# Patient Record
Sex: MALE | Race: WHITE | NOT HISPANIC OR LATINO | URBAN - METROPOLITAN AREA
[De-identification: names, ages, dates, MRNs, and addresses within clinical notes are randomized per-mention and may not be internally consistent; named-entity substitution may affect disease eponyms.]

---

## 2017-02-01 ENCOUNTER — OUTPATIENT (OUTPATIENT)
Dept: OUTPATIENT SERVICES | Facility: HOSPITAL | Age: 67
LOS: 1 days | Discharge: HOME | End: 2017-02-01

## 2018-01-02 ENCOUNTER — OUTPATIENT (OUTPATIENT)
Dept: OUTPATIENT SERVICES | Facility: HOSPITAL | Age: 68
LOS: 1 days | Discharge: HOME | End: 2018-01-02

## 2019-01-09 ENCOUNTER — OUTPATIENT (OUTPATIENT)
Dept: OUTPATIENT SERVICES | Facility: HOSPITAL | Age: 69
LOS: 1 days | End: 2019-01-09

## 2019-12-16 ENCOUNTER — OUTPATIENT (OUTPATIENT)
Dept: OUTPATIENT SERVICES | Facility: HOSPITAL | Age: 69
LOS: 1 days | End: 2019-12-16

## 2020-01-28 ENCOUNTER — APPOINTMENT (OUTPATIENT)
Dept: RADIOLOGY | Facility: CLINIC | Age: 70
End: 2020-01-28
Payer: MEDICARE

## 2020-01-28 PROCEDURE — 73620 X-RAY EXAM OF FOOT: CPT | Mod: 50

## 2020-01-28 PROCEDURE — 73610 X-RAY EXAM OF ANKLE: CPT | Mod: 50

## 2020-01-28 PROCEDURE — 77080 DXA BONE DENSITY AXIAL: CPT

## 2020-03-12 ENCOUNTER — OUTPATIENT (OUTPATIENT)
Dept: OUTPATIENT SERVICES | Facility: HOSPITAL | Age: 70
LOS: 1 days | End: 2020-03-12

## 2020-03-16 ENCOUNTER — APPOINTMENT (OUTPATIENT)
Dept: ULTRASOUND IMAGING | Facility: CLINIC | Age: 70
End: 2020-03-16
Payer: MEDICARE

## 2020-03-16 PROCEDURE — 76700 US EXAM ABDOM COMPLETE: CPT

## 2020-03-16 PROCEDURE — 76857 US EXAM PELVIC LIMITED: CPT

## 2021-02-23 ENCOUNTER — APPOINTMENT (OUTPATIENT)
Dept: ULTRASOUND IMAGING | Facility: CLINIC | Age: 71
End: 2021-02-23
Payer: MEDICARE

## 2021-02-23 PROCEDURE — 76770 US EXAM ABDO BACK WALL COMP: CPT

## 2021-02-24 ENCOUNTER — TRANSCRIPTION ENCOUNTER (OUTPATIENT)
Age: 71
End: 2021-02-24

## 2021-03-03 ENCOUNTER — APPOINTMENT (OUTPATIENT)
Dept: MRI IMAGING | Facility: CLINIC | Age: 71
End: 2021-03-03
Payer: MEDICARE

## 2021-03-03 PROCEDURE — 72195 MRI PELVIS W/O DYE: CPT | Mod: MH

## 2021-03-12 PROBLEM — Z00.00 ENCOUNTER FOR PREVENTIVE HEALTH EXAMINATION: Status: ACTIVE | Noted: 2021-03-12

## 2022-05-06 ENCOUNTER — APPOINTMENT (OUTPATIENT)
Dept: NEUROSURGERY | Facility: CLINIC | Age: 72
End: 2022-05-06
Payer: MEDICARE

## 2022-05-06 DIAGNOSIS — M54.9 DORSALGIA, UNSPECIFIED: ICD-10-CM

## 2022-05-06 DIAGNOSIS — M48.061 SPINAL STENOSIS, LUMBAR REGION WITHOUT NEUROGENIC CLAUDICATION: ICD-10-CM

## 2022-05-06 PROCEDURE — 99204 OFFICE O/P NEW MOD 45 MIN: CPT

## 2022-05-06 RX ORDER — CELECOXIB 200 MG/1
200 CAPSULE ORAL DAILY
Qty: 30 | Refills: 0 | Status: ACTIVE | COMMUNITY
Start: 2022-05-06 | End: 1900-01-01

## 2022-05-06 NOTE — HISTORY OF PRESENT ILLNESS
[< 3 months] : less than 3 months [FreeTextEntry1] : Low back pain/RLE radiculopathy [de-identified] : 71-year-old male presents to the neurosurgery office with his wife for an initial office visit for evaluation of back pain and right lower extremity radicular symptoms.  Patient states that his symptoms have been present for about a month with no mechanism of injury or recent trauma.  The patient was referred to our office by Dr. Meza.  He reports an incident where he had complaints of right buttock and right lower extremity pain, however not traveling past the knee.  With rest, modified activity, muscle relaxants, and physical therapy, patient states that his symptoms improved.  The patient had another recent incident about a week ago with similar complaints.  He reports that he is feeling better after again undergoing conservative treatment his primary care physician ordered an MRI of the lumbar spine which was performed at [place] which is available for review today.\par \par The patient also mentions previous history of a craniotomy which was performed approximately 9 years ago.

## 2022-05-06 NOTE — ASSESSMENT
[FreeTextEntry1] : Discussed the history, physical examination findings, and recent imaging with the patient and his wife with all questions answered.  Discussed that conservative treatment recommended at this time with rest and modified activity, anti-inflammatory medication/muscle relaxants, physical therapy, and pain management with referrals provided today.   Celebrex 200 mg has been sent electronically to the patient's pharmacy.  Discussed that there is no role for neurosurgical intervention at this time patient will follow up only as needed.

## 2022-05-06 NOTE — REASON FOR VISIT
[New Patient Visit] : a new patient visit [FreeTextEntry1] : PT here with lower back, buttocks,and R leg pain. Has disc.

## 2022-05-06 NOTE — PHYSICAL EXAM
[General Appearance - Alert] : alert [General Appearance - In No Acute Distress] : in no acute distress [General Appearance - Well Nourished] : well nourished [General Appearance - Well Developed] : well developed [General Appearance - Well-Appearing] : healthy appearing [] : normal voice and communication [Oriented To Time, Place, And Person] : oriented to person, place, and time [Impaired Insight] : insight and judgment were intact [Affect] : the affect was normal [Mood] : the mood was normal [Memory Recent] : recent memory was not impaired [Memory Remote] : remote memory was not impaired [Person] : oriented to person [Place] : oriented to place [Time] : oriented to time [Motor Tone] : muscle tone was normal in all four extremities [Motor Strength] : muscle strength was normal in all four extremities [Involuntary Movements] : no involuntary movements were seen [No Muscle Atrophy] : normal bulk in all four extremities [Abnormal Walk] : normal gait [FreeTextEntry8] : Patient ambulates unassisted, but reports that he does have a cane handy when needed

## 2022-05-06 NOTE — DATA REVIEWED
[de-identified] : Has been reviewed with the patient ([facility] -disc returned to the patient) -demonstrates lumbar stenosis most pronounced at L3-4 and L4-5

## 2022-11-10 ENCOUNTER — OFFICE (OUTPATIENT)
Dept: URBAN - METROPOLITAN AREA CLINIC 38 | Facility: CLINIC | Age: 72
Setting detail: OPHTHALMOLOGY
End: 2022-11-10
Payer: MEDICARE

## 2022-11-10 DIAGNOSIS — Z79.899: ICD-10-CM

## 2022-11-10 DIAGNOSIS — H35.033: ICD-10-CM

## 2022-11-10 DIAGNOSIS — H25.13: ICD-10-CM

## 2022-11-10 DIAGNOSIS — H43.393: ICD-10-CM

## 2022-11-10 DIAGNOSIS — H35.40: ICD-10-CM

## 2022-11-10 PROCEDURE — 92083 EXTENDED VISUAL FIELD XM: CPT | Performed by: OPHTHALMOLOGY

## 2022-11-10 PROCEDURE — 99213 OFFICE O/P EST LOW 20 MIN: CPT | Performed by: OPHTHALMOLOGY

## 2022-11-10 ASSESSMENT — REFRACTION_CURRENTRX
OS_ADD: +2.25
OS_OVR_VA: 20/
OS_AXIS: 074
OS_CYLINDER: -2.00
OS_VPRISM_DIRECTION: PROGS
OD_CYLINDER: -1.50
OD_SPHERE: +1.25
OD_ADD: +2.25
OS_SPHERE: PLANO
OD_VPRISM_DIRECTION: PROGS
OD_OVR_VA: 20/
OD_AXIS: 141
OS_ADD: +2.25
OS_AXIS: 070
OD_SPHERE: +1.00
OD_VPRISM_DIRECTION: PROGS
OS_CYLINDER: -2.00
OS_VPRISM_DIRECTION: PROGS
OD_AXIS: 142
OS_OVR_VA: 20/
OD_CYLINDER: -1.50
OS_SPHERE: +0.25
OD_OVR_VA: 20/

## 2022-11-10 ASSESSMENT — REFRACTION_MANIFEST
OD_VA2: 20/20
OD_AXIS: 135
OS_VA1: 20/20
OD_VA1: 20/25-1
OS_AXIS: 070
OS_ADD: +2.50
OS_ADD: +2.50
OS_SPHERE: -1.25
OS_CYLINDER: -2.00
OD_ADD: +2.50
OS_SPHERE: -1.25
OD_SPHERE: +0.75
OD_CYLINDER: -1.50
OS_CYLINDER: -2.00
OS_VA1: 20/25-1
OD_ADD: +2.50
OD_VA1: 20/25-1
OS_AXIS: 070
OS_VA2: 20/20
OD_CYLINDER: -1.50
OD_AXIS: 125
OU_VA: 20/20-1
OD_SPHERE: +0.50

## 2022-11-10 ASSESSMENT — REFRACTION_AUTOREFRACTION
OS_AXIS: 070
OS_CYLINDER: -2.75
OD_CYLINDER: -2.25
OD_AXIS: 126
OS_SPHERE: -0.75
OD_SPHERE: +2.00

## 2022-11-10 ASSESSMENT — AXIALLENGTH_DERIVED
OD_AL: 23.0588
OD_AL: 22.6476
OS_AL: 23.5503
OS_AL: 23.5019
OD_AL: 22.9662
OS_AL: 23.5503

## 2022-11-10 ASSESSMENT — KERATOMETRY
OS_K1POWER_DIOPTERS: 45.25
METHOD_AUTO_MANUAL: AUTO
OD_K2POWER_DIOPTERS: 45.75
OD_AXISANGLE_DEGREES: 048
OS_K2POWER_DIOPTERS: 46.75
OD_K1POWER_DIOPTERS: 44.75
OS_AXISANGLE_DEGREES: 145

## 2022-11-10 ASSESSMENT — VISUAL ACUITY
OS_BCVA: 20/50-2
OD_BCVA: 20/30-2

## 2022-11-10 ASSESSMENT — CONFRONTATIONAL VISUAL FIELD TEST (CVF)
OD_FINDINGS: FULL
OS_FINDINGS: FULL

## 2022-11-10 ASSESSMENT — SPHEQUIV_DERIVED
OS_SPHEQUIV: -2.25
OD_SPHEQUIV: 0
OS_SPHEQUIV: -2.25
OD_SPHEQUIV: -0.25
OS_SPHEQUIV: -2.125
OD_SPHEQUIV: 0.875

## 2023-06-01 ENCOUNTER — OFFICE (OUTPATIENT)
Dept: URBAN - METROPOLITAN AREA CLINIC 38 | Facility: CLINIC | Age: 73
Setting detail: OPHTHALMOLOGY
End: 2023-06-01
Payer: MEDICARE

## 2023-06-01 DIAGNOSIS — H35.40: ICD-10-CM

## 2023-06-01 DIAGNOSIS — H35.033: ICD-10-CM

## 2023-06-01 DIAGNOSIS — H43.393: ICD-10-CM

## 2023-06-01 DIAGNOSIS — H25.13: ICD-10-CM

## 2023-06-01 DIAGNOSIS — H90.3: ICD-10-CM

## 2023-06-01 DIAGNOSIS — Z79.899: ICD-10-CM

## 2023-06-01 PROCEDURE — 92557 COMPREHENSIVE HEARING TEST: CPT | Performed by: AUDIOLOGIST

## 2023-06-01 PROCEDURE — 92014 COMPRE OPH EXAM EST PT 1/>: CPT | Performed by: OPHTHALMOLOGY

## 2023-06-01 PROCEDURE — 92134 CPTRZ OPH DX IMG PST SGM RTA: CPT | Performed by: OPHTHALMOLOGY

## 2023-06-01 PROCEDURE — 92567 TYMPANOMETRY: CPT | Performed by: AUDIOLOGIST

## 2023-06-01 ASSESSMENT — REFRACTION_AUTOREFRACTION
OS_AXIS: 071
OS_CYLINDER: -2.75
OD_AXIS: 135
OD_CYLINDER: -2.25
OD_SPHERE: +1.75
OS_SPHERE: -0.75

## 2023-06-01 ASSESSMENT — REFRACTION_MANIFEST
OD_CYLINDER: -1.50
OS_VA2: 20/25(J1)
OD_AXIS: 125
OU_VA: 20/25+2
OS_SPHERE: -1.25
OS_CYLINDER: -2.25
OS_AXIS: 065
OD_ADD: +2.50
OS_SPHERE: -1.25
OD_ADD: +2.50
OD_CYLINDER: -1.50
OS_ADD: +2.50
OD_VA2: 20/25(J1)
OD_VA1: 20/30-
OD_SPHERE: +1.25
OD_VA2: 20/25(J1)
OU_VA: 20/25+2
OS_VA2: 20/25(J1)
OS_VA1: 20/30
OD_VA1: 20/30-
OD_SPHERE: +1.00
OS_VA1: 20/30
OS_ADD: +2.50
OS_CYLINDER: -2.25
OD_AXIS: 125
OS_AXIS: 065

## 2023-06-01 ASSESSMENT — REFRACTION_CURRENTRX
OD_OVR_VA: 20/
OS_ADD: +2.25
OD_CYLINDER: -1.50
OD_AXIS: 126
OD_VPRISM_DIRECTION: PROGS
OS_CYLINDER: -2.00
OS_OVR_VA: 20/
OD_AXIS: 141
OD_SPHERE: +1.00
OS_AXIS: 067
OD_SPHERE: +1.25
OD_AXIS: 142
OD_OVR_VA: 20/
OS_OVR_VA: 20/
OS_AXIS: 070
OS_VPRISM_DIRECTION: PROGS
OS_ADD: +2.50
OS_AXIS: 074
OS_VPRISM_DIRECTION: PROGS
OD_ADD: +2.25
OS_VPRISM_DIRECTION: PROGS
OS_SPHERE: -1.25
OD_ADD: +2.25
OS_ADD: +2.25
OD_VPRISM_DIRECTION: PROGS
OD_SPHERE: +0.75
OD_VPRISM_DIRECTION: PROGS
OD_CYLINDER: -1.50
OS_OVR_VA: 20/
OS_CYLINDER: -2.00
OS_SPHERE: PLANO
OS_CYLINDER: -2.00
OD_OVR_VA: 20/
OS_SPHERE: +0.25
OD_CYLINDER: -1.50

## 2023-06-01 ASSESSMENT — KERATOMETRY
OD_K1POWER_DIOPTERS: 44.75
OS_K2POWER_DIOPTERS: 46.00
METHOD_AUTO_MANUAL: AUTO
OD_K2POWER_DIOPTERS: 45.50
OD_AXISANGLE_DEGREES: 043
OS_K1POWER_DIOPTERS: 45.00
OS_AXISANGLE_DEGREES: 161

## 2023-06-01 ASSESSMENT — CONFRONTATIONAL VISUAL FIELD TEST (CVF)
OS_FINDINGS: FULL
OD_FINDINGS: FULL

## 2023-06-01 ASSESSMENT — AXIALLENGTH_DERIVED
OD_AL: 22.8259
OS_AL: 23.7842
OD_AL: 22.9175
OD_AL: 22.7804
OS_AL: 23.6856
OS_AL: 23.7842

## 2023-06-01 ASSESSMENT — VISUAL ACUITY
OD_BCVA: 20/40-2
OS_BCVA: 20/30-

## 2023-06-01 ASSESSMENT — SPHEQUIV_DERIVED
OS_SPHEQUIV: -2.125
OS_SPHEQUIV: -2.375
OD_SPHEQUIV: 0.625
OD_SPHEQUIV: 0.5
OS_SPHEQUIV: -2.375
OD_SPHEQUIV: 0.25

## 2023-06-01 ASSESSMENT — TONOMETRY
OS_IOP_MMHG: 15
OD_IOP_MMHG: 14

## 2023-09-22 PROBLEM — H01.004 BLEPHARITIS; RIGHT UPPER LID, RIGHT LOWER LID, LEFT UPPER LID, LEFT LOWER LID: Status: ACTIVE | Noted: 2023-09-22

## 2023-09-22 PROBLEM — H01.002 BLEPHARITIS; RIGHT UPPER LID, RIGHT LOWER LID, LEFT UPPER LID, LEFT LOWER LID: Status: ACTIVE | Noted: 2023-09-22

## 2023-09-22 PROBLEM — H01.005 BLEPHARITIS; RIGHT UPPER LID, RIGHT LOWER LID, LEFT UPPER LID, LEFT LOWER LID: Status: ACTIVE | Noted: 2023-09-22

## 2023-09-22 PROBLEM — H01.001 BLEPHARITIS; RIGHT UPPER LID, RIGHT LOWER LID, LEFT UPPER LID, LEFT LOWER LID: Status: ACTIVE | Noted: 2023-09-22

## 2023-09-26 PROBLEM — H04.123 DRY EYE SYNDROME LACRIMAL GLAND; BOTH EYES: Status: ACTIVE | Noted: 2023-09-26

## 2024-01-18 ENCOUNTER — OFFICE (OUTPATIENT)
Dept: URBAN - METROPOLITAN AREA CLINIC 38 | Facility: CLINIC | Age: 74
Setting detail: OPHTHALMOLOGY
End: 2024-01-18
Payer: MEDICARE

## 2024-01-18 DIAGNOSIS — H43.393: ICD-10-CM

## 2024-01-18 DIAGNOSIS — H25.13: ICD-10-CM

## 2024-01-18 DIAGNOSIS — H35.033: ICD-10-CM

## 2024-01-18 DIAGNOSIS — H35.40: ICD-10-CM

## 2024-01-18 DIAGNOSIS — Z79.899: ICD-10-CM

## 2024-01-18 PROCEDURE — 92083 EXTENDED VISUAL FIELD XM: CPT | Performed by: OPHTHALMOLOGY

## 2024-01-18 PROCEDURE — 92134 CPTRZ OPH DX IMG PST SGM RTA: CPT | Performed by: OPHTHALMOLOGY

## 2024-01-18 PROCEDURE — 92014 COMPRE OPH EXAM EST PT 1/>: CPT | Performed by: OPHTHALMOLOGY

## 2024-01-18 ASSESSMENT — REFRACTION_MANIFEST
OD_VA2: 20/25(J1)
OU_VA: 20/25+2
OS_SPHERE: -1.25
OD_SPHERE: +1.00
OS_CYLINDER: -2.25
OU_VA: 20/25+2
OD_CYLINDER: -1.50
OD_AXIS: 125
OD_VA2: 20/25(J1)
OS_VA2: 20/25(J1)
OS_VA1: 20/30
OD_ADD: +2.50
OD_VA1: 20/30-
OS_VA1: 20/30
OD_AXIS: 125
OD_ADD: +2.50
OS_ADD: +2.50
OD_SPHERE: +1.25
OS_VA2: 20/25(J1)
OS_AXIS: 065
OS_CYLINDER: -2.25
OD_CYLINDER: -1.50
OS_ADD: +2.50
OS_AXIS: 065
OD_VA1: 20/30-
OS_SPHERE: -1.25

## 2024-01-18 ASSESSMENT — REFRACTION_CURRENTRX
OS_AXIS: 067
OD_OVR_VA: 20/
OS_SPHERE: +0.25
OS_OVR_VA: 20/
OD_AXIS: 126
OS_VPRISM_DIRECTION: PROGS
OD_OVR_VA: 20/
OS_VPRISM_DIRECTION: PROGS
OS_ADD: +2.50
OD_VPRISM_DIRECTION: PROGS
OD_OVR_VA: 20/
OD_ADD: +2.25
OS_OVR_VA: 20/
OS_AXIS: 074
OD_VPRISM_DIRECTION: PROGS
OD_SPHERE: +0.75
OS_SPHERE: PLANO
OS_CYLINDER: -2.00
OD_CYLINDER: -1.50
OS_AXIS: 070
OS_ADD: +2.25
OD_AXIS: 142
OS_OVR_VA: 20/
OD_SPHERE: +1.00
OS_CYLINDER: -2.00
OD_SPHERE: +1.25
OS_VPRISM_DIRECTION: PROGS
OD_AXIS: 141
OS_SPHERE: -1.25
OS_CYLINDER: -2.00
OS_ADD: +2.25
OD_CYLINDER: -1.50
OD_CYLINDER: -1.50
OD_VPRISM_DIRECTION: PROGS
OD_ADD: +2.25

## 2024-01-18 ASSESSMENT — SPHEQUIV_DERIVED
OS_SPHEQUIV: -2.125
OD_SPHEQUIV: 0.25
OS_SPHEQUIV: -2.375
OD_SPHEQUIV: 0.5
OS_SPHEQUIV: -2.375
OD_SPHEQUIV: 0.625

## 2024-01-18 ASSESSMENT — REFRACTION_AUTOREFRACTION
OS_CYLINDER: -2.75
OS_AXIS: 071
OD_CYLINDER: -2.25
OS_SPHERE: -0.75
OD_AXIS: 135
OD_SPHERE: +1.75

## 2024-01-18 ASSESSMENT — CONFRONTATIONAL VISUAL FIELD TEST (CVF)
OS_FINDINGS: FULL
OD_FINDINGS: FULL

## 2024-07-25 ENCOUNTER — OFFICE (OUTPATIENT)
Dept: URBAN - METROPOLITAN AREA CLINIC 38 | Facility: CLINIC | Age: 74
Setting detail: OPHTHALMOLOGY
End: 2024-07-25
Payer: MEDICARE

## 2024-07-25 DIAGNOSIS — H35.40: ICD-10-CM

## 2024-07-25 DIAGNOSIS — H52.4: ICD-10-CM

## 2024-07-25 DIAGNOSIS — H25.13: ICD-10-CM

## 2024-07-25 DIAGNOSIS — H43.393: ICD-10-CM

## 2024-07-25 DIAGNOSIS — Z79.899: ICD-10-CM

## 2024-07-25 DIAGNOSIS — H35.033: ICD-10-CM

## 2024-07-25 PROCEDURE — 92015 DETERMINE REFRACTIVE STATE: CPT | Performed by: OPHTHALMOLOGY

## 2024-07-25 PROCEDURE — 92014 COMPRE OPH EXAM EST PT 1/>: CPT | Performed by: OPHTHALMOLOGY

## 2024-07-25 ASSESSMENT — CONFRONTATIONAL VISUAL FIELD TEST (CVF)
OD_FINDINGS: FULL
OS_FINDINGS: FULL

## 2024-10-02 PROBLEM — H04.123 DRY EYE SYNDROME LACRIMAL GLAND; BOTH EYES: Status: ACTIVE | Noted: 2024-10-02

## 2025-01-30 ENCOUNTER — OFFICE (OUTPATIENT)
Dept: URBAN - METROPOLITAN AREA CLINIC 38 | Facility: CLINIC | Age: 75
Setting detail: OPHTHALMOLOGY
End: 2025-01-30
Payer: MEDICARE

## 2025-01-30 DIAGNOSIS — Z79.899: ICD-10-CM

## 2025-01-30 DIAGNOSIS — H35.033: ICD-10-CM

## 2025-01-30 DIAGNOSIS — H43.393: ICD-10-CM

## 2025-01-30 DIAGNOSIS — H25.13: ICD-10-CM

## 2025-01-30 DIAGNOSIS — H35.40: ICD-10-CM

## 2025-01-30 PROCEDURE — 92015 DETERMINE REFRACTIVE STATE: CPT | Performed by: OPHTHALMOLOGY

## 2025-01-30 PROCEDURE — 92083 EXTENDED VISUAL FIELD XM: CPT | Performed by: OPHTHALMOLOGY

## 2025-01-30 PROCEDURE — 92014 COMPRE OPH EXAM EST PT 1/>: CPT | Performed by: OPHTHALMOLOGY

## 2025-01-30 ASSESSMENT — REFRACTION_CURRENTRX
OS_VPRISM_DIRECTION: PROGS
OS_AXIS: 065
OD_SPHERE: +1.00
OD_ADD: +2.25
OS_VPRISM_DIRECTION: PROGS
OD_OVR_VA: 20/
OD_VPRISM_DIRECTION: PROGS
OD_OVR_VA: 20/
OD_ADD: +2.25
OS_AXIS: 060
OD_AXIS: 140
OD_SPHERE: +1.00
OS_VPRISM_DIRECTION: PROGS
OS_SPHERE: -1.25
OS_SPHERE: -1.25
OD_SPHERE: +0.75
OD_VPRISM_DIRECTION: PROGS
OS_ADD: +1.75
OD_AXIS: 127
OD_CYLINDER: -1.50
OD_ADD: +2.00
OS_ADD: +2.50
OD_OVR_VA: 20/
OD_VPRISM_DIRECTION: PROGS
OS_SPHERE: PLANO
OS_CYLINDER: -2.00
OD_CYLINDER: -1.50
OS_CYLINDER: -2.00
OD_CYLINDER: -1.50
OS_OVR_VA: 20/
OS_OVR_VA: 20/
OS_CYLINDER: -2.25
OS_AXIS: 067
OS_ADD: +2.75
OS_OVR_VA: 20/
OD_AXIS: 126

## 2025-01-30 ASSESSMENT — REFRACTION_MANIFEST
OD_AXIS: 125
OS_ADD: +2.75
OS_AXIS: 065
OU_VA: 20/30-1
OD_CYLINDER: -1.50
OS_VA1: 20/30
OD_VA1: 20/50+2
OD_SPHERE: +0.75
OU_VA: 20/30-1
OD_CYLINDER: -1.50
OD_VA2: 20/25(J1)
OD_VA2: 20/25(J1)
OD_AXIS: 125
OD_SPHERE: +1.00
OS_CYLINDER: -2.25
OD_ADD: +2.75
OS_ADD: +2.50
OD_ADD: +2.50
OS_VA2: 20/25(J1)
OS_CYLINDER: -2.00
OS_AXIS: 070
OS_SPHERE: -1.25
OS_VA2: 20/25(J1)
OD_VA1: 20/50+2
OS_VA1: 20/30
OS_SPHERE: PLANO

## 2025-01-30 ASSESSMENT — KERATOMETRY
OS_K2POWER_DIOPTERS: 46.00
OS_K1POWER_DIOPTERS: 45.00
OS_AXISANGLE_DEGREES: 164
OD_K2POWER_DIOPTERS: 45.50
METHOD_AUTO_MANUAL: AUTO
OD_AXISANGLE_DEGREES: 036
OD_K1POWER_DIOPTERS: 45.00

## 2025-01-30 ASSESSMENT — VISUAL ACUITY
OS_BCVA: 20/50
OD_BCVA: 20/40

## 2025-01-30 ASSESSMENT — REFRACTION_AUTOREFRACTION
OS_SPHERE: -0.25
OS_CYLINDER: -2.75
OD_CYLINDER: -2.25
OD_SPHERE: +2.00
OD_AXIS: 123
OS_AXIS: 072

## 2025-01-30 ASSESSMENT — CONFRONTATIONAL VISUAL FIELD TEST (CVF)
OD_FINDINGS: FULL
OS_FINDINGS: FULL

## 2025-01-30 ASSESSMENT — TONOMETRY
OD_IOP_MMHG: 13
OS_IOP_MMHG: 13

## 2025-04-08 ENCOUNTER — APPOINTMENT (OUTPATIENT)
Dept: CARDIOLOGY | Facility: CLINIC | Age: 75
End: 2025-04-08
Payer: MEDICARE

## 2025-04-08 VITALS
HEART RATE: 65 BPM | OXYGEN SATURATION: 95 % | DIASTOLIC BLOOD PRESSURE: 70 MMHG | BODY MASS INDEX: 28.04 KG/M2 | SYSTOLIC BLOOD PRESSURE: 124 MMHG | WEIGHT: 207 LBS | HEIGHT: 72 IN

## 2025-04-08 VITALS — SYSTOLIC BLOOD PRESSURE: 118 MMHG | DIASTOLIC BLOOD PRESSURE: 70 MMHG

## 2025-04-08 DIAGNOSIS — Z87.898 PERSONAL HISTORY OF OTHER SPECIFIED CONDITIONS: ICD-10-CM

## 2025-04-08 DIAGNOSIS — A80.9 ACUTE POLIOMYELITIS, UNSPECIFIED: ICD-10-CM

## 2025-04-08 DIAGNOSIS — E78.5 HYPERLIPIDEMIA, UNSPECIFIED: ICD-10-CM

## 2025-04-08 DIAGNOSIS — Z98.890 OTHER SPECIFIED POSTPROCEDURAL STATES: ICD-10-CM

## 2025-04-08 DIAGNOSIS — F10.90 ALCOHOL USE, UNSPECIFIED, UNCOMPLICATED: ICD-10-CM

## 2025-04-08 DIAGNOSIS — Z87.39 PERSONAL HISTORY OF OTHER DISEASES OF THE MUSCULOSKELETAL SYSTEM AND CONNECTIVE TISSUE: ICD-10-CM

## 2025-04-08 DIAGNOSIS — Z82.49 FAMILY HISTORY OF ISCHEMIC HEART DISEASE AND OTHER DISEASES OF THE CIRCULATORY SYSTEM: ICD-10-CM

## 2025-04-08 DIAGNOSIS — Z80.42 FAMILY HISTORY OF MALIGNANT NEOPLASM OF PROSTATE: ICD-10-CM

## 2025-04-08 DIAGNOSIS — I63.9 CEREBRAL INFARCTION, UNSPECIFIED: ICD-10-CM

## 2025-04-08 DIAGNOSIS — I10 ESSENTIAL (PRIMARY) HYPERTENSION: ICD-10-CM

## 2025-04-08 DIAGNOSIS — Z86.69 PERSONAL HISTORY OF OTHER DISEASES OF THE NERVOUS SYSTEM AND SENSE ORGANS: ICD-10-CM

## 2025-04-08 DIAGNOSIS — K21.9 GASTRO-ESOPHAGEAL REFLUX DISEASE W/OUT ESOPHAGITIS: ICD-10-CM

## 2025-04-08 DIAGNOSIS — Z87.438 PERSONAL HISTORY OF OTHER DISEASES OF MALE GENITAL ORGANS: ICD-10-CM

## 2025-04-08 DIAGNOSIS — R01.1 CARDIAC MURMUR, UNSPECIFIED: ICD-10-CM

## 2025-04-08 DIAGNOSIS — Z80.0 FAMILY HISTORY OF MALIGNANT NEOPLASM OF DIGESTIVE ORGANS: ICD-10-CM

## 2025-04-08 PROCEDURE — 99204 OFFICE O/P NEW MOD 45 MIN: CPT | Mod: 25

## 2025-04-08 PROCEDURE — 93000 ELECTROCARDIOGRAM COMPLETE: CPT

## 2025-04-08 RX ORDER — ALFUZOSIN HYDROCHLORIDE 10 MG/1
10 TABLET, EXTENDED RELEASE ORAL DAILY
Refills: 0 | Status: ACTIVE | COMMUNITY

## 2025-04-08 RX ORDER — ELECTROLYTES/DEXTROSE
SOLUTION, ORAL ORAL
Refills: 0 | Status: ACTIVE | COMMUNITY

## 2025-04-08 RX ORDER — LAMOTRIGINE 150 MG/1
150 TABLET ORAL DAILY
Refills: 0 | Status: ACTIVE | COMMUNITY

## 2025-04-08 RX ORDER — DUTASTERIDE 0.5 MG/1
0.5 CAPSULE, LIQUID FILLED ORAL DAILY
Refills: 0 | Status: ACTIVE | COMMUNITY

## 2025-04-08 RX ORDER — ICOSAPENT ETHYL 1000 MG/1
1 CAPSULE ORAL
Qty: 180 | Refills: 1 | Status: ACTIVE | COMMUNITY

## 2025-04-08 RX ORDER — LEVOCETIRIZINE DIHYDROCHLORIDE 5 MG/1
5 TABLET, FILM COATED ORAL DAILY
Refills: 0 | Status: ACTIVE | COMMUNITY

## 2025-04-08 RX ORDER — TADALAFIL 5 MG/1
5 TABLET, FILM COATED ORAL
Refills: 0 | Status: ACTIVE | COMMUNITY

## 2025-04-08 RX ORDER — METOPROLOL SUCCINATE 50 MG/1
50 TABLET, EXTENDED RELEASE ORAL
Qty: 90 | Refills: 3 | Status: ACTIVE | COMMUNITY

## 2025-04-08 RX ORDER — FOLIC ACID 1 MG/1
1 TABLET ORAL
Refills: 0 | Status: ACTIVE | COMMUNITY

## 2025-04-08 RX ORDER — ROSUVASTATIN CALCIUM 40 MG/1
40 TABLET, FILM COATED ORAL
Qty: 90 | Refills: 1 | Status: ACTIVE | COMMUNITY

## 2025-04-08 RX ORDER — CHROMIUM 200 MCG
TABLET ORAL
Refills: 0 | Status: ACTIVE | COMMUNITY

## 2025-04-08 RX ORDER — LISINOPRIL 20 MG/1
20 TABLET ORAL
Qty: 90 | Refills: 1 | Status: ACTIVE | COMMUNITY

## 2025-04-08 RX ORDER — SUCRALFATE 1 G/1
1 TABLET ORAL 4 TIMES DAILY
Refills: 0 | Status: DISCONTINUED | COMMUNITY
End: 2025-04-08

## 2025-04-08 RX ORDER — ESOMEPRAZOLE MAGNESIUM 20 MG/1
20 CAPSULE, DELAYED RELEASE ORAL
Refills: 0 | Status: ACTIVE | COMMUNITY

## 2025-04-08 RX ORDER — SENNOSIDES 8.6 MG
TABLET ORAL
Refills: 0 | Status: ACTIVE | COMMUNITY

## 2025-04-08 RX ORDER — HYDROXYCHLOROQUINE SULFATE 200 MG/1
200 TABLET, FILM COATED ORAL TWICE DAILY
Refills: 0 | Status: ACTIVE | COMMUNITY

## 2025-05-09 ENCOUNTER — NON-APPOINTMENT (OUTPATIENT)
Age: 75
End: 2025-05-09

## 2025-05-13 ENCOUNTER — APPOINTMENT (OUTPATIENT)
Dept: CARDIOLOGY | Facility: CLINIC | Age: 75
End: 2025-05-13
Payer: MEDICARE

## 2025-05-13 PROCEDURE — 93978 VASCULAR STUDY: CPT

## 2025-05-13 PROCEDURE — 93306 TTE W/DOPPLER COMPLETE: CPT

## 2025-05-13 PROCEDURE — 93880 EXTRACRANIAL BILAT STUDY: CPT

## 2025-05-30 ENCOUNTER — APPOINTMENT (OUTPATIENT)
Dept: CARDIOLOGY | Facility: CLINIC | Age: 75
End: 2025-05-30
Payer: MEDICARE

## 2025-05-30 VITALS
HEIGHT: 72 IN | HEART RATE: 70 BPM | BODY MASS INDEX: 27.63 KG/M2 | DIASTOLIC BLOOD PRESSURE: 72 MMHG | OXYGEN SATURATION: 98 % | SYSTOLIC BLOOD PRESSURE: 120 MMHG | WEIGHT: 204 LBS

## 2025-05-30 DIAGNOSIS — I10 ESSENTIAL (PRIMARY) HYPERTENSION: ICD-10-CM

## 2025-05-30 DIAGNOSIS — E78.5 HYPERLIPIDEMIA, UNSPECIFIED: ICD-10-CM

## 2025-05-30 DIAGNOSIS — I70.90 UNSPECIFIED ATHEROSCLEROSIS: ICD-10-CM

## 2025-05-30 DIAGNOSIS — I35.9 NONRHEUMATIC AORTIC VALVE DISORDER, UNSPECIFIED: ICD-10-CM

## 2025-05-30 PROCEDURE — 99214 OFFICE O/P EST MOD 30 MIN: CPT

## 2025-05-31 LAB — HBA1C MFR BLD HPLC: 5.6

## 2025-08-07 ENCOUNTER — OFFICE (OUTPATIENT)
Dept: URBAN - METROPOLITAN AREA CLINIC 38 | Facility: CLINIC | Age: 75
Setting detail: OPHTHALMOLOGY
End: 2025-08-07
Payer: MEDICARE

## 2025-08-07 DIAGNOSIS — H35.033: ICD-10-CM

## 2025-08-07 DIAGNOSIS — H25.13: ICD-10-CM

## 2025-08-07 DIAGNOSIS — H35.40: ICD-10-CM

## 2025-08-07 DIAGNOSIS — Z79.899: ICD-10-CM

## 2025-08-07 DIAGNOSIS — H43.393: ICD-10-CM

## 2025-08-07 PROCEDURE — 92134 CPTRZ OPH DX IMG PST SGM RTA: CPT | Performed by: OPHTHALMOLOGY

## 2025-08-07 PROCEDURE — 99213 OFFICE O/P EST LOW 20 MIN: CPT | Performed by: OPHTHALMOLOGY

## 2025-08-07 ASSESSMENT — REFRACTION_CURRENTRX
OS_ADD: +2.50
OD_CYLINDER: -1.50
OS_AXIS: 070
OS_SPHERE: PLANO
OS_ADD: +2.50
OS_VPRISM_DIRECTION: PROGS
OD_AXIS: 130
OD_OVR_VA: 20/
OD_ADD: +2.50
OS_ADD: +2.75
OS_CYLINDER: -2.25
OD_CYLINDER: -1.50
OD_ADD: +2.25
OD_SPHERE: +0.75
OD_VPRISM_DIRECTION: PROGS
OD_SPHERE: +1.00
OD_VPRISM_DIRECTION: PROGS
OS_ADD: +1.75
OD_ADD: +2.00
OS_AXIS: 067
OS_CYLINDER: -1.75
OS_OVR_VA: 20/
OD_OVR_VA: 20/
OS_AXIS: 060
OD_SPHERE: +1.00
OD_AXIS: 140
OS_SPHERE: -1.25
OS_SPHERE: -1.25
OS_VPRISM_DIRECTION: PROGS
OD_VPRISM_DIRECTION: PROGS
OD_AXIS: 126
OD_AXIS: 127
OS_SPHERE: PLANO
OS_AXIS: 065
OS_VPRISM_DIRECTION: PROGS
OS_OVR_VA: 20/
OD_CYLINDER: -1.25
OD_OVR_VA: 20/
OD_CYLINDER: -1.50
OD_ADD: +2.25
OS_OVR_VA: 20/
OS_VPRISM_DIRECTION: PROGS
OD_VPRISM_DIRECTION: PROGS
OS_CYLINDER: -2.00
OD_SPHERE: +0.75
OS_CYLINDER: -2.00

## 2025-08-07 ASSESSMENT — REFRACTION_MANIFEST
OS_VA2: 20/25(J1)
OD_VA1: 20/50+2
OS_CYLINDER: -2.00
OD_ADD: +2.50
OS_AXIS: 065
OS_ADD: +2.75
OS_AXIS: 070
OD_SPHERE: +1.00
OU_VA: 20/30-1
OD_ADD: +2.75
OS_VA1: 20/30
OS_SPHERE: PLANO
OU_VA: 20/30
OS_VA1: 20/30
OD_VA2: 20/25(J1)
OS_VA2: 20/25(J1)
OD_CYLINDER: -1.50
OD_VA2: 20/25(J1)
OD_CYLINDER: -1.50
OD_AXIS: 125
OS_CYLINDER: -2.00
OD_SPHERE: +0.75
OS_ADD: +2.50
OD_VA1: 20/40-2
OD_AXIS: 125
OS_SPHERE: PLANO

## 2025-08-07 ASSESSMENT — REFRACTION_AUTOREFRACTION
OS_AXIS: 072
OD_AXIS: 121
OS_CYLINDER: -2.50
OS_SPHERE: -0.50
OD_CYLINDER: -1.75
OD_SPHERE: +1.50

## 2025-08-07 ASSESSMENT — KERATOMETRY
OD_AXISANGLE_DEGREES: 045
OD_K1POWER_DIOPTERS: 45.00
OS_K1POWER_DIOPTERS: 45.00
OD_K2POWER_DIOPTERS: 45.25
OS_K2POWER_DIOPTERS: 46.00
METHOD_AUTO_MANUAL: AUTO
OS_AXISANGLE_DEGREES: 146

## 2025-08-07 ASSESSMENT — VISUAL ACUITY
OS_BCVA: 20/50-
OD_BCVA: 20/40

## 2025-08-07 ASSESSMENT — CONFRONTATIONAL VISUAL FIELD TEST (CVF)
OD_FINDINGS: FULL
OS_FINDINGS: FULL

## 2025-09-19 ENCOUNTER — APPOINTMENT (OUTPATIENT)
Dept: CARDIOLOGY | Facility: CLINIC | Age: 75
End: 2025-09-19
Payer: MEDICARE

## 2025-09-19 DIAGNOSIS — I70.90 UNSPECIFIED ATHEROSCLEROSIS: ICD-10-CM

## 2025-09-19 PROCEDURE — 93015 CV STRESS TEST SUPVJ I&R: CPT
